# Patient Record
Sex: FEMALE | Employment: FULL TIME | URBAN - METROPOLITAN AREA
[De-identification: names, ages, dates, MRNs, and addresses within clinical notes are randomized per-mention and may not be internally consistent; named-entity substitution may affect disease eponyms.]

---

## 2019-09-17 ENCOUNTER — OFFICE VISIT (OUTPATIENT)
Dept: PEDIATRICS | Facility: CLINIC | Age: 29
End: 2019-09-17
Payer: COMMERCIAL

## 2019-09-17 VITALS
HEART RATE: 65 BPM | DIASTOLIC BLOOD PRESSURE: 62 MMHG | HEIGHT: 64 IN | SYSTOLIC BLOOD PRESSURE: 118 MMHG | WEIGHT: 148 LBS | TEMPERATURE: 97.6 F | OXYGEN SATURATION: 99 % | BODY MASS INDEX: 25.27 KG/M2

## 2019-09-17 DIAGNOSIS — L84 CORN OR CALLUS: Primary | ICD-10-CM

## 2019-09-17 PROCEDURE — 99203 OFFICE O/P NEW LOW 30 MIN: CPT | Performed by: NURSE PRACTITIONER

## 2019-09-17 ASSESSMENT — MIFFLIN-ST. JEOR: SCORE: 1371.32

## 2019-09-17 NOTE — PATIENT INSTRUCTIONS
"Patient Education     May try Salicylic Acid Plaster-40% is available over-the-counter without a prescription   -Cut the plaster to the size of the lesion.  -Leave in place for 48-72 hours; keep dry.  -Remove the white \"dead\" skin with a metal nail file or pumice stone each night before replacing the patch. You can use tape over the patch to keep it in place.  -Use of the patch should stop once the lesion has resolved.   Wear shoes and socks that fit properly. Tight shoes or shoes with high heels can cause corns and calluses.  Avoid going barefoot, or wearing shoes without socks.  You can also use special pads inside your shoes to prevent rubbing.  Follow up if lesions do not resolve within two weeks  Treating Corns and Calluses  If your corns or calluses are mild, reducing friction may help. Different shoes, moleskin patches, or soft pads may be all the treatment you need. In more severe cases, treating tissue buildup may require your doctor s care. Sometimes custom-made shoe inserts (orthotics) or special pads are prescribed to reduce friction and pressure.     Doctor examining senior man's foot.   Change shoes  If you have corns, your doctor may suggest wearing shoes that have more toe room. This way, buckled joints are less likely to be pinched against the top of the shoe. If you have calluses, wearing a cushioned insole, arch support, or heel counter can help reduce friction.  Visit your doctor  In some cases, your doctor may trim away the outer layers of skin that make up the corn or callus. For a painful corn, medicine may be injected beneath the built-up tissue.  Wear orthotics  Orthotics are specially made to meet the needs of your feet. They cushion calluses or divert pressure away from these problem areas. Worn as directed, orthotics help limit existing problems and prevent new ones from forming.  If you need surgery  If a bone or joint is out of place, certain parts of your foot may be under too much " pressure. This can cause severe corns and calluses. In such cases, surgery may be the best way to correct the problem.  Outpatient procedures  In most cases, surgery to improve bone position is an outpatient procedure. Your doctor may cut away excess bone, reposition prominent bones, or even fuse joints. Sometimes tendons or ligaments are cut to reduce tension on a bone or joint. Your doctor will talk with you about the procedure that is best suited to your needs.  Date Last Reviewed: 7/1/2016 2000-2018 [employer]. [address] [place], [place] [id number]. All rights reserved. This information is not intended as a substitute for professional medical care. Always follow your healthcare professional's instructions.

## 2019-09-17 NOTE — PROGRESS NOTES
"Subjective     Deloris Levi is a 29 year old female who is new to the clinic, otherwise in good health, and presents to clinic today for the following health issues:    HPI   Foot Pain    Onset: 1.5 years    Description:   Location: left foot   Character: Dull ache    Intensity: mild    Progression of Symptoms: worse    Accompanying Signs & Symptoms:  Other symptoms: none    History:   Previous similar pain: no       Precipitating factors:   Trauma or overuse: no     Alleviating factors:  Improved by: rest/inactivity    Therapies Tried and outcome: Corn Bandages did not help       Moved from [facility] 4 mos ago.  Started very small, increasing in size.   For the past 1 week, painful with weight bearing/walking. No pain at rest.  Tried OTC corn cap x24 hours and was able to pick some skin away.  No other MSK injury/pain.    Reviewed and updated as needed this visit by Provider  Meds  Problems         Review of Systems   Otherwise ROS is negative except as stated above.        Objective    /62 (BP Location: Right arm, Patient Position: Chair, Cuff Size: Adult Regular)   Pulse 65   Temp 97.6  F (36.4  C) (Tympanic)   Ht 1.626 m (5' 4\")   Wt 67.1 kg (148 lb)   SpO2 99%   BMI 25.40 kg/m    Body mass index is 25.4 kg/m .  Physical Exam   GENERAL: healthy, alert and no distress  SKIN: x1 hyperkeratotic papule slightly tender to direct pressure/palpation with central core noted on plantar surface of left foot at base 5th and 4th toe. Lesion/dead skin pared down with 15 blade, no bleeding or thrombosed capillaries present with this    Diagnostic Test Results:  none         Assessment & Plan     1. Corn or callus    Patient Instructions     Patient Education     May try Salicylic Acid Plaster-40% is available over-the-counter without a prescription   -Cut the plaster to the size of the lesion.  -Leave in place for 48-72 hours; keep dry.  -Remove the white \"dead\" skin with a metal nail file or pumice stone each " night before replacing the patch. You can use tape over the patch to keep it in place.  -Use of the patch should stop once the lesion has resolved.   Wear shoes and socks that fit properly. Tight shoes or shoes with high heels can cause corns and calluses.  Avoid going barefoot, or wearing shoes without socks.  You can also use special pads inside your shoes to prevent rubbing.  Follow up if lesions do not resolve within two weeks  Treating Corns and Calluses  If your corns or calluses are mild, reducing friction may help. Different shoes, moleskin patches, or soft pads may be all the treatment you need. In more severe cases, treating tissue buildup may require your doctor s care. Sometimes custom-made shoe inserts (orthotics) or special pads are prescribed to reduce friction and pressure.     Doctor examining senior man's foot.   Change shoes  If you have corns, your doctor may suggest wearing shoes that have more toe room. This way, buckled joints are less likely to be pinched against the top of the shoe. If you have calluses, wearing a cushioned insole, arch support, or heel counter can help reduce friction.  Visit your doctor  In some cases, your doctor may trim away the outer layers of skin that make up the corn or callus. For a painful corn, medicine may be injected beneath the built-up tissue.  Wear orthotics  Orthotics are specially made to meet the needs of your feet. They cushion calluses or divert pressure away from these problem areas. Worn as directed, orthotics help limit existing problems and prevent new ones from forming.  If you need surgery  If a bone or joint is out of place, certain parts of your foot may be under too much pressure. This can cause severe corns and calluses. In such cases, surgery may be the best way to correct the problem.  Outpatient procedures  In most cases, surgery to improve bone position is an outpatient procedure. Your doctor may cut away excess bone, reposition prominent  bones, or even fuse joints. Sometimes tendons or ligaments are cut to reduce tension on a bone or joint. Your doctor will talk with you about the procedure that is best suited to your needs.  Date Last Reviewed: 7/1/2016 2000-2018 [employer]. [address] [place] [address]. All rights reserved. This information is not intended as a substitute for professional medical care. Always follow your healthcare professional's instructions.             Return in about 2 weeks (around 10/1/2019) for Follow-up if symptoms do not improve or worsen.    Susana Groves NP  [facility]

## 2020-09-08 ENCOUNTER — OFFICE VISIT (OUTPATIENT)
Dept: PEDIATRICS | Facility: CLINIC | Age: 30
End: 2020-09-08
Payer: COMMERCIAL

## 2020-09-08 VITALS
RESPIRATION RATE: 16 BRPM | SYSTOLIC BLOOD PRESSURE: 100 MMHG | WEIGHT: 148 LBS | TEMPERATURE: 98.2 F | BODY MASS INDEX: 25.4 KG/M2 | OXYGEN SATURATION: 100 % | HEART RATE: 70 BPM | DIASTOLIC BLOOD PRESSURE: 62 MMHG

## 2020-09-08 DIAGNOSIS — Z86.39 HISTORY OF HYPERLIPIDEMIA: Primary | ICD-10-CM

## 2020-09-08 PROCEDURE — 80061 LIPID PANEL: CPT | Performed by: PHYSICIAN ASSISTANT

## 2020-09-08 PROCEDURE — 99213 OFFICE O/P EST LOW 20 MIN: CPT | Performed by: PHYSICIAN ASSISTANT

## 2020-09-08 PROCEDURE — 36415 COLL VENOUS BLD VENIPUNCTURE: CPT | Performed by: PHYSICIAN ASSISTANT

## 2020-09-08 NOTE — PROGRESS NOTES
Subjective     Deloris Levi is a 30 year old female who presents to clinic today for the following health issues:    HPI   Pt states she had borderline elevated cholesterol around 2 years ago while pregnant.  Chol level was 200.      Patient only wants chol level. Refuses any other screening labs. Refuses physical.     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    FH: father with elevated BP. Mother with diabetes.     Review of Systems   Constitutional, HEENT, cardiovascular, pulmonary, gi and gu systems are negative, except as otherwise noted.      Objective    /62   Pulse 70   Temp 98.2  F (36.8  C) (Tympanic)   Resp 16   Wt 67.1 kg (148 lb)   SpO2 100%   BMI 25.40 kg/m    Body mass index is 25.4 kg/m .  Physical Exam   GENERAL: healthy, alert and no distress  RESP: lungs clear to auscultation - no rales, rhonchi or wheezes  CV: regular rate and rhythm, normal S1 S2, no S3 or S4    No results found for this or any previous visit (from the past 24 hour(s)).        Assessment & Plan   (Z86.39) History of hyperlipidemia  (primary encounter diagnosis)  Comment: labs completed today to recheck. Patient has no further concerns.  Plan: Lipid panel reflex to direct LDL Fasting          Mansoor Villareal PA-C  [facility]

## 2020-09-08 NOTE — PROGRESS NOTES
Dwayne Levi is a 30 year old female who presents to clinic today for the following health issues:    HPI       Hyperlipidemia Follow-Up      Are you regularly taking any medication or supplement to lower your cholesterol?   { :423471}    Are you having muscle aches or other side effects that you think could be caused by your cholesterol lowering medication?  { :322360}      How many servings of fruits and vegetables do you eat daily?  { :422185}    On average, how many sweetened beverages do you drink each day (Examples: soda, juice, sweet tea, etc.  Do NOT count diet or artificially sweetened beverages)?   { 1-11:247076}    How many days per week do you exercise enough to make your heart beat faster? { :747936}    How many minutes a day do you exercise enough to make your heart beat faster? { :255982}    How many days per week do you miss taking your medication? {0-7 :552311}    {additonal problems for provider to add (Optional):702817}    Review of Systems   {ROS COMP (Optional):573366}      Objective    There were no vitals taken for this visit.  There is no height or weight on file to calculate BMI.  Physical Exam   {Exam List (Optional):065671}    {Diagnostic Test Results (Optional):165362}        {PROVIDER CHARTING PREFERENCE:316396}

## 2020-09-08 NOTE — LETTER
September 9, 2020      Deloris Levi  [address] [place] [address]        Dear ce,    Here are the results of your cholesterol levels.   Everything looks fine. No medications indicated.     Mansoor Villareal PA-C     Resulted Orders   Lipid panel reflex to direct LDL Fasting   Result Value Ref Range    Cholesterol 186 <200 mg/dL    Triglycerides 143 <150 mg/dL    HDL Cholesterol 49 (L) >49 mg/dL    LDL Cholesterol Calculated 108 (H) <100 mg/dL      Comment:      Above desirable:  100-129 mg/dl  Borderline High:  130-159 mg/dL  High:             160-189 mg/dL  Very high:       >189 mg/dl      Non HDL Cholesterol 137 (H) <130 mg/dL      Comment:      Above Desirable:  130-159 mg/dl  Borderline high:  160-189 mg/dl  High:             190-219 mg/dl  Very high:       >219 mg/dl

## 2020-09-09 LAB
CHOLEST SERPL-MCNC: 186 MG/DL
HDLC SERPL-MCNC: 49 MG/DL
LDLC SERPL CALC-MCNC: 108 MG/DL
NONHDLC SERPL-MCNC: 137 MG/DL
TRIGL SERPL-MCNC: 143 MG/DL

## 2020-09-13 ENCOUNTER — NURSE TRIAGE (OUTPATIENT)
Dept: NURSING | Facility: CLINIC | Age: 30
End: 2020-09-13

## 2020-09-13 NOTE — TELEPHONE ENCOUNTER
S: Lipid panel results.  B: Wife gave writer permission to talk to her  about her health care. Writer gave results of lupid panel results and provider comments.  A:  Provider indicated to medications will be needed.  R: Letter will be mailed out to patient. Patient verbalized understanding, in agreement with plan, and voiced no further questions.  Rocio Siddiqui RN, [facility]      Additional Information    Health Information question, no triage required and triager able to answer question    Protocols used: INFORMATION ONLY CALL-A-    COVID 19 Nurse Triage Plan/Patient Instructions    Please be aware that novel coronavirus (COVID-19) may be circulating in the community. If you develop symptoms such as fever, cough, or SOB or if you have concerns about the presence of another infection including coronavirus (COVID-19), please contact your health care provider or visit [email or web address].     Disposition/Instructions    Home care recommended. Follow home care protocol based instructions.    Thank you for taking steps to prevent the spread of this virus.  o Limit your contact with others.  o Wear a simple mask to cover your cough.  o Wash your hands well and often.    Resources    [facility]: About COVID-19: [email or web address]    CDC: What to Do If You're Sick: www.cdc.gov/coronavirus/2019-ncov/about/steps-when-sick.html    CDC: Ending Home Isolation: www.cdc.gov/coronavirus/2019-ncov/hcp/disposition-in-home-patients.html     CDC: Caring for Someone: www.cdc.gov/coronavirus/2019-ncov/if-you-are-sick/care-for-someone.html     [facility]: Interim Guidance for Hospital Discharge to Home: www.health.[facility].[place]/diseases/coronavirus/hcp/hospdischarge.pdf    [facility] clinical trials (COVID-19 research studies): clinicalaffairs.[email or web address]/umn-clinical-trials     Below are the COVID-19 hotlines at the [place] Department of [facility] ([facility]). Interpreters are available.   o For health  questions: Call 751-896-0158 or 1-894.275.8173 (7 a.m. to 7 p.m.)  o For questions about schools and childcare: Call 665-139-9283 or 1-462.684.4846 (7 a.m. to 7 p.m.)

## 2021-11-09 ENCOUNTER — OFFICE VISIT (OUTPATIENT)
Dept: URGENT CARE | Facility: URGENT CARE | Age: 31
End: 2021-11-09
Payer: COMMERCIAL

## 2021-11-09 VITALS
SYSTOLIC BLOOD PRESSURE: 150 MMHG | RESPIRATION RATE: 20 BRPM | DIASTOLIC BLOOD PRESSURE: 78 MMHG | TEMPERATURE: 98 F | HEART RATE: 78 BPM | OXYGEN SATURATION: 98 %

## 2021-11-09 DIAGNOSIS — R22.9 LOCALIZED SUPERFICIAL SWELLING, MASS, OR LUMP: Primary | ICD-10-CM

## 2021-11-09 PROCEDURE — 99213 OFFICE O/P EST LOW 20 MIN: CPT | Performed by: PHYSICIAN ASSISTANT

## 2021-11-09 NOTE — PATIENT INSTRUCTIONS
Patient Education     Lipoma, No Treatment  A lipoma is a non-cancerous (benign) tumor made up of fat tissue. It appears as a soft raised area, just under the skin. It's usually less than 2 inches across.   Home care  General information regarding lipoma includes:    No special care is needed for a lipoma.    You can consider removal for cosmetic reasons.    Sometimes lipomas are uncomfortable because they put pressure on surrounding tissues. This is also a reason to have a lipoma removed.  Follow-up care  Follow up with your healthcare provider, or as advised if you want to have the lipoma removed at a later time.   When to seek medical advice  Call your healthcare provider right away if any of the following occur:    Redness, pain, tenderness, or drainage from the lipoma    Lipoma starts to enlarge, change shape, or become more solid    Changes in the color of the skin over the lipoma  Sara last reviewed this educational content on 7/1/2019 2000-2021 [employer]. All rights reserved. This information is not intended as a substitute for professional medical care. Always follow your healthcare professional's instructions.

## 2021-11-13 NOTE — PROGRESS NOTES
SUBJECTIVE:  Deloris Levi is a 31 year old female who presents to the clinic today for swelling of right anterior thigh.  Onset of rash was 2 week(s) ago.   Rash is still present. No pain, not changing    History reviewed. No pertinent past medical history.  No current outpatient medications on file.     Social History     Tobacco Use     Smoking status: Not on file     Smokeless tobacco: Not on file   Substance Use Topics     Alcohol use: Not on file       ROS:  Review of systems negative except as stated above.    EXAM:   BP (!) 150/78   Pulse 78   Temp 98  F (36.7  C) (Tympanic)   Resp 20   SpO2 98%   GENERAL: alert, no acute distress.  SKIN: soft , nontender, mobile area of swelling  RESP: lungs clear to auscultation - no rales, rhonchi or wheezes  CV: regular rates and rhythm, normal S1 S2, no murmur noted  NEURO: Normal strength and tone, sensory exam grossly normal,  normal speech and mentation    ASSESSMENT:  (R22.9) Localized superficial swelling, mass, or lump  (primary encounter diagnosis)  Comment: appears to be lipoma  Plan: Adult Dermatology Referral        To derm for confirmation and plan    Patient Instructions     Patient Education     Lipoma, No Treatment  A lipoma is a non-cancerous (benign) tumor made up of fat tissue. It appears as a soft raised area, just under the skin. It's usually less than 2 inches across.   Home care  General information regarding lipoma includes:    No special care is needed for a lipoma.    You can consider removal for cosmetic reasons.    Sometimes lipomas are uncomfortable because they put pressure on surrounding tissues. This is also a reason to have a lipoma removed.  Follow-up care  Follow up with your healthcare provider, or as advised if you want to have the lipoma removed at a later time.   When to seek medical advice  Call your healthcare provider right away if any of the following occur:    Redness, pain, tenderness, or drainage from the  lipoma    Lipoma starts to enlarge, change shape, or become more solid    Changes in the color of the skin over the lipoma  [employer] last reviewed this educational content on 7/1/2019 2000-2021 [employer]. All rights reserved. This information is not intended as a substitute for professional medical care. Always follow your healthcare professional's instructions.

## 2021-12-05 ENCOUNTER — HEALTH MAINTENANCE LETTER (OUTPATIENT)
Age: 31
End: 2021-12-05

## 2021-12-07 ENCOUNTER — OFFICE VISIT (OUTPATIENT)
Dept: PEDIATRICS | Facility: CLINIC | Age: 31
End: 2021-12-07
Payer: COMMERCIAL

## 2021-12-07 VITALS
DIASTOLIC BLOOD PRESSURE: 78 MMHG | TEMPERATURE: 98.6 F | HEART RATE: 76 BPM | HEIGHT: 64 IN | SYSTOLIC BLOOD PRESSURE: 124 MMHG | BODY MASS INDEX: 26.21 KG/M2 | WEIGHT: 153.5 LBS | RESPIRATION RATE: 12 BRPM | OXYGEN SATURATION: 99 %

## 2021-12-07 DIAGNOSIS — Z01.419 ENCOUNTER FOR GYNECOLOGICAL EXAMINATION WITHOUT ABNORMAL FINDING: ICD-10-CM

## 2021-12-07 DIAGNOSIS — Z00.00 ROUTINE GENERAL MEDICAL EXAMINATION AT A HEALTH CARE FACILITY: Primary | ICD-10-CM

## 2021-12-07 DIAGNOSIS — Z13.0 SCREENING, IRON DEFICIENCY ANEMIA: ICD-10-CM

## 2021-12-07 DIAGNOSIS — Z13.21 ENCOUNTER FOR VITAMIN DEFICIENCY SCREENING: ICD-10-CM

## 2021-12-07 DIAGNOSIS — Z12.4 SCREENING FOR CERVICAL CANCER: ICD-10-CM

## 2021-12-07 DIAGNOSIS — E55.9 VITAMIN D DEFICIENCY: ICD-10-CM

## 2021-12-07 DIAGNOSIS — Z13.29 SCREENING FOR THYROID DISORDER: ICD-10-CM

## 2021-12-07 DIAGNOSIS — R79.0 LOW IRON STORES: ICD-10-CM

## 2021-12-07 DIAGNOSIS — Z13.1 SCREENING FOR DIABETES MELLITUS: ICD-10-CM

## 2021-12-07 DIAGNOSIS — Z13.220 SCREENING FOR LIPID DISORDERS: ICD-10-CM

## 2021-12-07 DIAGNOSIS — Z11.51 SCREENING FOR HUMAN PAPILLOMAVIRUS: ICD-10-CM

## 2021-12-07 DIAGNOSIS — Z23 NEED FOR INFLUENZA VACCINATION: ICD-10-CM

## 2021-12-07 LAB
ERYTHROCYTE [DISTWIDTH] IN BLOOD BY AUTOMATED COUNT: 12.9 % (ref 10–15)
HBA1C MFR BLD: 5.6 % (ref 0–5.6)
HCT VFR BLD AUTO: 38.1 % (ref 35–47)
HGB BLD-MCNC: 12.4 G/DL (ref 11.7–15.7)
MCH RBC QN AUTO: 29 PG (ref 26.5–33)
MCHC RBC AUTO-ENTMCNC: 32.5 G/DL (ref 31.5–36.5)
MCV RBC AUTO: 89 FL (ref 78–100)
PLATELET # BLD AUTO: 269 10E3/UL (ref 150–450)
RBC # BLD AUTO: 4.28 10E6/UL (ref 3.8–5.2)
WBC # BLD AUTO: 6.5 10E3/UL (ref 4–11)

## 2021-12-07 PROCEDURE — 99395 PREV VISIT EST AGE 18-39: CPT | Mod: 25 | Performed by: STUDENT IN AN ORGANIZED HEALTH CARE EDUCATION/TRAINING PROGRAM

## 2021-12-07 PROCEDURE — 85027 COMPLETE CBC AUTOMATED: CPT | Performed by: STUDENT IN AN ORGANIZED HEALTH CARE EDUCATION/TRAINING PROGRAM

## 2021-12-07 PROCEDURE — 83036 HEMOGLOBIN GLYCOSYLATED A1C: CPT | Performed by: STUDENT IN AN ORGANIZED HEALTH CARE EDUCATION/TRAINING PROGRAM

## 2021-12-07 PROCEDURE — 90471 IMMUNIZATION ADMIN: CPT | Performed by: STUDENT IN AN ORGANIZED HEALTH CARE EDUCATION/TRAINING PROGRAM

## 2021-12-07 PROCEDURE — 84443 ASSAY THYROID STIM HORMONE: CPT | Performed by: STUDENT IN AN ORGANIZED HEALTH CARE EDUCATION/TRAINING PROGRAM

## 2021-12-07 PROCEDURE — G0145 SCR C/V CYTO,THINLAYER,RESCR: HCPCS | Performed by: STUDENT IN AN ORGANIZED HEALTH CARE EDUCATION/TRAINING PROGRAM

## 2021-12-07 PROCEDURE — 87624 HPV HI-RISK TYP POOLED RSLT: CPT | Performed by: STUDENT IN AN ORGANIZED HEALTH CARE EDUCATION/TRAINING PROGRAM

## 2021-12-07 PROCEDURE — 36415 COLL VENOUS BLD VENIPUNCTURE: CPT | Performed by: STUDENT IN AN ORGANIZED HEALTH CARE EDUCATION/TRAINING PROGRAM

## 2021-12-07 PROCEDURE — 82306 VITAMIN D 25 HYDROXY: CPT | Performed by: STUDENT IN AN ORGANIZED HEALTH CARE EDUCATION/TRAINING PROGRAM

## 2021-12-07 PROCEDURE — 82728 ASSAY OF FERRITIN: CPT | Performed by: STUDENT IN AN ORGANIZED HEALTH CARE EDUCATION/TRAINING PROGRAM

## 2021-12-07 PROCEDURE — 90686 IIV4 VACC NO PRSV 0.5 ML IM: CPT | Performed by: STUDENT IN AN ORGANIZED HEALTH CARE EDUCATION/TRAINING PROGRAM

## 2021-12-07 PROCEDURE — 80061 LIPID PANEL: CPT | Performed by: STUDENT IN AN ORGANIZED HEALTH CARE EDUCATION/TRAINING PROGRAM

## 2021-12-07 ASSESSMENT — ENCOUNTER SYMPTOMS
SORE THROAT: 0
MYALGIAS: 0
JOINT SWELLING: 0
CONSTIPATION: 0
NAUSEA: 0
ARTHRALGIAS: 0
CHILLS: 0
DIARRHEA: 0
HEADACHES: 0
ABDOMINAL PAIN: 0
DYSURIA: 0
NERVOUS/ANXIOUS: 1
PARESTHESIAS: 0
DIZZINESS: 0
COUGH: 0
FEVER: 0
HEMATOCHEZIA: 0
SHORTNESS OF BREATH: 0
WEAKNESS: 1
HEMATURIA: 0
PALPITATIONS: 0
HEARTBURN: 0
EYE PAIN: 0
FREQUENCY: 0

## 2021-12-07 ASSESSMENT — MIFFLIN-ST. JEOR: SCORE: 1382.78

## 2021-12-07 NOTE — PROGRESS NOTES
SUBJECTIVE:   CC: Deloris Levi is an 31 year old woman who presents for preventive health visit.       Patient has been advised of split billing requirements and indicates understanding: Yes  Healthy Habits:     Getting at least 3 servings of Calcium per day:  NO    Bi-annual eye exam:  NO    Dental care twice a year:  Yes    Sleep apnea or symptoms of sleep apnea:  None    Diet:  Breakfast skipped    Frequency of exercise:  None    Taking medications regularly:  Not Applicable    Medication side effects:  None    PHQ-2 Total Score: 2    Additional concerns today:  No      2 children: 8 & 4 year old     Works in [employer] for [employer]    Concerned about diabetes as mother has diabetes mellitus  Also concerned about hyperlipidemia as in a pregnancy it was high  Also had abnl TSH in pregnancy  Had tubal ligation surgery previously  Feels diet is not great (doesn't love dairy products) and interested in vitamin testing and nutrition recommendations  Never had Pap before      Today's PHQ-2 Score:   PHQ-2 ( 1999 Pfizer) 12/7/2021   Q1: Little interest or pleasure in doing things 1   Q2: Feeling down, depressed or hopeless 1   PHQ-2 Score 2   Q1: Little interest or pleasure in doing things Several days   Q2: Feeling down, depressed or hopeless Several days   PHQ-2 Score 2       Abuse: Current or Past (Physical, Sexual or Emotional) - No  Do you feel safe in your environment? Yes    Have you ever done Advance Care Planning? (For example, a Health Directive, POLST, or a discussion with a medical provider or your loved ones about your wishes): No, advance care planning information given to patient to review.  Patient declined advance care planning discussion at this time.    Social History     Tobacco Use     Smoking status: Never Smoker     Smokeless tobacco: Never Used   Substance Use Topics     Alcohol use: Never     If you drink alcohol do you typically have >3 drinks per day or >7 drinks per week? Not applicable    Alcohol  "Use 12/7/2021   Prescreen: >3 drinks/day or >7 drinks/week? Not Applicable       Reviewed orders with patient.  Reviewed health maintenance and updated orders accordingly - Yes  Lab work is in process    Breast Cancer Screening:  Any new diagnosis of family breast, ovarian, or bowel cancer? No    FHS-7: No flowsheet data found.    Patient under 40 years of age: Routine Mammogram Screening not recommended.   Pertinent mammograms are reviewed under the imaging tab.    History of abnormal Pap smear: NO - age 30-65 PAP every 5 years with negative HPV co-testing recommended     Reviewed and updated as needed this visit by clinical staff  Tobacco  Allergies  Meds   Med Hx  Surg Hx  Fam Hx  Soc Hx       Reviewed and updated as needed this visit by Provider                   Review of Systems   Constitutional: Negative for chills and fever.   HENT: Negative for congestion, ear pain, hearing loss and sore throat.    Eyes: Negative for pain and visual disturbance.   Respiratory: Negative for cough and shortness of breath.    Cardiovascular: Negative for chest pain, palpitations and peripheral edema.   Gastrointestinal: Negative for abdominal pain, constipation, diarrhea, heartburn, hematochezia and nausea.   Genitourinary: Negative for dysuria, frequency, genital sores, hematuria and urgency.   Musculoskeletal: Negative for arthralgias, joint swelling and myalgias.   Skin: Negative for rash.   Neurological: Positive for weakness. Negative for dizziness, headaches and paresthesias.   Psychiatric/Behavioral: Positive for mood changes. The patient is nervous/anxious.         OBJECTIVE:   /78 (BP Location: Right arm, Patient Position: Sitting, Cuff Size: Adult Regular)   Pulse 76   Temp 98.6  F (37  C) (Tympanic)   Resp 12   Ht 1.62 m (5' 3.78\")   Wt 69.6 kg (153 lb 8 oz)   LMP 11/05/2021 (Within Days)   SpO2 99%   BMI 26.53 kg/m    Physical Exam  GENERAL: healthy, alert and no distress  EYES: Eyes grossly " normal to inspection, PERRL and conjunctivae and sclerae normal  HENT: ear canals and TM's normal, nose and mouth without ulcers or lesions  NECK: no adenopathy, no asymmetry, masses, or scars and thyroid normal to palpation  RESP: lungs clear to auscultation - no rales, rhonchi or wheezes  CV: regular rate and rhythm, normal S1 S2, no S3 or S4, no murmur, click or rub, no peripheral edema and peripheral pulses strong  ABDOMEN: soft, nontender, no hepatosplenomegaly, no masses and bowel sounds normal   (female): normal female external genitalia, normal urethral meatus, vaginal mucosa pink, moist, well rugated, and normal cervix/adnexa/uterus without masses or discharge  MS: no gross musculoskeletal defects noted, no edema  SKIN: no suspicious lesions or rashes  NEURO: Normal strength and tone, mentation intact and speech normal  PSYCH: mentation appears normal, affect normal/bright      ASSESSMENT/PLAN:       ICD-10-CM    1. Routine general medical examination at a health care facility  Z00.00    2. Screening for human papillomavirus  Z11.51 Pap Screen with HPV - recommended age 30 - 65 years   3. Screening for cervical cancer  Z12.4 Pap Screen with HPV - recommended age 30 - 65 years   4. Encounter for gynecological examination without abnormal finding  Z01.419    5. Screening for lipid disorders  Z13.220 Lipid panel reflex to direct LDL Fasting     Lipid Profile (Chol, Trig, HDL, LDL calc)     Lipid panel reflex to direct LDL Fasting     CANCELED: Lipid Profile (Chol, Trig, HDL, LDL calc)   6. Need for influenza vaccination  Z23 INFLUENZA VACCINE IM >6 MO VALENT IIV4 (ALFURIA/FLUZONE)   7. Screening for diabetes mellitus  Z13.1 Hemoglobin A1c     Hemoglobin A1c   8. Screening, iron deficiency anemia  Z13.0 Ferritin     CBC with platelets     Ferritin     CBC with platelets   9. Encounter for vitamin deficiency screening  Z13.21 Vitamin D Deficiency     Vitamin D Deficiency   10. Screening for thyroid disorder   "Z13.29 TSH with free T4 reflex     TSH with free T4 reflex       COUNSELING:  Reviewed preventive health counseling, as reflected in patient instructions       Regular exercise       Healthy diet/nutrition       Immunizations    Vaccinated for: Influenza and Declined:COVID19 booster due to has appointment later this week for it and prefers to separate it from flu shot       Contraception       Safe sex practices/STD prevention       Consider Hep C screening for all patients one time for ages 18-79 years       HIV screeninx in teen years, 1x in adult years, and at intervals if high risk    Estimated body mass index is 26.53 kg/m  as calculated from the following:    Height as of this encounter: 1.62 m (5' 3.78\").    Weight as of this encounter: 69.6 kg (153 lb 8 oz).      She reports that she has never smoked. She has never used smokeless tobacco.      Counseling Resources:  ATP IV Guidelines  Pooled Cohorts Equation Calculator  Breast Cancer Risk Calculator  BRCA-Related Cancer Risk Assessment: FHS-7 Tool  FRAX Risk Assessment  ICSI Preventive Guidelines  Dietary Guidelines for Americans,   USDA's MyPlate  ASA Prophylaxis  Lung CA Screening    Darby Vaughn MD  [facility]  "

## 2021-12-07 NOTE — PATIENT INSTRUCTIONS
So nice to meet you!    CALCIUM    Recommendations:  Teenagers and premenopausal women: 1200 mg/day    If you are not eating dairy products you also need 800 IU of vitamin D per day which can be obtained in either a multivitamin or in some of the Calcium tablets.    Dietary sources: These also contain vitamin D  Milk                            8 oz            300 mg  Yogurt                          1 cup           400 mg  Hard cheese                     1.5 oz          300 mg  Cottage cheese                  2 cup           300 mg  Orange juice with Calcium       8 oz            300 mg  Low fat dairy sources are recommended    Supplements:  Tums EX                         300 mg  Tums Ultra                      400 mg  Caltrate 600                    600 mg  Oscal                           500 mg  Oscal/D                         500 mg plus vitamin D  Women's Formula Multivitamin    450 mg       Gardisil is the HPV vaccine  -HPV is the virus that is linked to cervical cancer          Preventive Health Recommendations  Female Ages 26 - 39  Yearly exam:   See your health care provider every year in order to    Review health changes.     Discuss preventive care.      Review your medicines if you your doctor has prescribed any.    Until age 30: Get a Pap test every three years (more often if you have had an abnormal result).    After age 30: Talk to your doctor about whether you should have a Pap test every 3 years or have a Pap test with HPV screening every 5 years.   You do not need a Pap test if your uterus was removed (hysterectomy) and you have not had cancer.  You should be tested each year for STDs (sexually transmitted diseases), if you're at risk.   Talk to your provider about how often to have your cholesterol checked.  If you are at risk for diabetes, you should have a diabetes test (fasting glucose).  Shots: Get a flu shot each year. Get a tetanus shot every 10 years.   Nutrition:     Eat at least 5 servings  of fruits and vegetables each day.    Eat whole-grain bread, whole-wheat pasta and brown rice instead of white grains and rice.    Get adequate Calcium and Vitamin D.     Lifestyle    Exercise at least 150 minutes a week (30 minutes a day, 5 days of the week). This will help you control your weight and prevent disease.    Limit alcohol to one drink per day.    No smoking.     Wear sunscreen to prevent skin cancer.    See your dentist every six months for an exam and cleaning.

## 2021-12-08 LAB — DEPRECATED CALCIDIOL+CALCIFEROL SERPL-MC: 12 UG/L (ref 20–75)

## 2021-12-09 LAB
FERRITIN SERPL-MCNC: 11 NG/ML (ref 12–150)
TSH SERPL DL<=0.005 MIU/L-ACNC: 2.27 MU/L (ref 0.4–4)

## 2021-12-09 RX ORDER — ERGOCALCIFEROL 1.25 MG/1
50000 CAPSULE, LIQUID FILLED ORAL WEEKLY
Qty: 8 CAPSULE | Refills: 0 | Status: SHIPPED | OUTPATIENT
Start: 2021-12-09 | End: 2022-01-28

## 2021-12-10 LAB
BKR LAB AP GYN ADEQUACY: NORMAL
BKR LAB AP GYN INTERPRETATION: NORMAL
BKR LAB AP HPV REFLEX: NORMAL
BKR LAB AP PREVIOUS ABNORMAL: NORMAL
PATH REPORT.COMMENTS IMP SPEC: NORMAL
PATH REPORT.COMMENTS IMP SPEC: NORMAL
PATH REPORT.RELEVANT HX SPEC: NORMAL

## 2021-12-14 LAB
HUMAN PAPILLOMA VIRUS 16 DNA: NEGATIVE
HUMAN PAPILLOMA VIRUS 18 DNA: NEGATIVE
HUMAN PAPILLOMA VIRUS FINAL DIAGNOSIS: NORMAL
HUMAN PAPILLOMA VIRUS OTHER HR: NEGATIVE

## 2021-12-17 LAB
CHOLEST SERPL-MCNC: 216 MG/DL
FASTING STATUS PATIENT QL REPORTED: ABNORMAL
HDLC SERPL-MCNC: 47 MG/DL
LDLC SERPL CALC-MCNC: 140 MG/DL
NONHDLC SERPL-MCNC: 169 MG/DL
TRIGL SERPL-MCNC: 145 MG/DL

## 2022-09-18 ENCOUNTER — HEALTH MAINTENANCE LETTER (OUTPATIENT)
Age: 32
End: 2022-09-18

## 2022-11-09 ENCOUNTER — OFFICE VISIT (OUTPATIENT)
Dept: PEDIATRICS | Facility: CLINIC | Age: 32
End: 2022-11-09
Payer: COMMERCIAL

## 2022-11-09 VITALS
RESPIRATION RATE: 20 BRPM | HEART RATE: 76 BPM | DIASTOLIC BLOOD PRESSURE: 62 MMHG | HEIGHT: 64 IN | BODY MASS INDEX: 24.24 KG/M2 | SYSTOLIC BLOOD PRESSURE: 94 MMHG | OXYGEN SATURATION: 98 % | TEMPERATURE: 98.2 F | WEIGHT: 142 LBS

## 2022-11-09 DIAGNOSIS — R79.0 LOW IRON STORES: ICD-10-CM

## 2022-11-09 DIAGNOSIS — M25.561 RIGHT KNEE PAIN, UNSPECIFIED CHRONICITY: Primary | ICD-10-CM

## 2022-11-09 DIAGNOSIS — L65.9 HAIR LOSS: ICD-10-CM

## 2022-11-09 DIAGNOSIS — E55.9 VITAMIN D DEFICIENCY: ICD-10-CM

## 2022-11-09 LAB
ERYTHROCYTE [DISTWIDTH] IN BLOOD BY AUTOMATED COUNT: 12.4 % (ref 10–15)
HCT VFR BLD AUTO: 36.4 % (ref 35–47)
HGB BLD-MCNC: 12.1 G/DL (ref 11.7–15.7)
MCH RBC QN AUTO: 29.9 PG (ref 26.5–33)
MCHC RBC AUTO-ENTMCNC: 33.2 G/DL (ref 31.5–36.5)
MCV RBC AUTO: 90 FL (ref 78–100)
PLATELET # BLD AUTO: 306 10E3/UL (ref 150–450)
RBC # BLD AUTO: 4.05 10E6/UL (ref 3.8–5.2)
TSH SERPL DL<=0.005 MIU/L-ACNC: 3.05 MU/L (ref 0.4–4)
WBC # BLD AUTO: 6.2 10E3/UL (ref 4–11)

## 2022-11-09 PROCEDURE — 85027 COMPLETE CBC AUTOMATED: CPT | Performed by: NURSE PRACTITIONER

## 2022-11-09 PROCEDURE — 99214 OFFICE O/P EST MOD 30 MIN: CPT | Performed by: NURSE PRACTITIONER

## 2022-11-09 PROCEDURE — 84443 ASSAY THYROID STIM HORMONE: CPT | Performed by: NURSE PRACTITIONER

## 2022-11-09 PROCEDURE — 82306 VITAMIN D 25 HYDROXY: CPT | Performed by: NURSE PRACTITIONER

## 2022-11-09 PROCEDURE — 36415 COLL VENOUS BLD VENIPUNCTURE: CPT | Performed by: NURSE PRACTITIONER

## 2022-11-09 ASSESSMENT — PAIN SCALES - GENERAL: PAINLEVEL: MODERATE PAIN (5)

## 2022-11-09 ASSESSMENT — PATIENT HEALTH QUESTIONNAIRE - PHQ9
10. IF YOU CHECKED OFF ANY PROBLEMS, HOW DIFFICULT HAVE THESE PROBLEMS MADE IT FOR YOU TO DO YOUR WORK, TAKE CARE OF THINGS AT HOME, OR GET ALONG WITH OTHER PEOPLE: NOT DIFFICULT AT ALL
SUM OF ALL RESPONSES TO PHQ QUESTIONS 1-9: 2
SUM OF ALL RESPONSES TO PHQ QUESTIONS 1-9: 2

## 2022-11-09 NOTE — PROGRESS NOTES
Assessment & Plan     Right knee pain, unspecified chronicity  Likely overuse injury after recent move. If not improving with time then recommend PT.   - Physical Therapy Referral; Future    Hair loss  Diffuse, no reported bald spots. Labs as below.  - Ferritin; Future  - CBC with platelets; Future  - Vitamin D Deficiency; Future  - TSH with free T4 reflex; Future  - CBC with platelets  - Vitamin D Deficiency  - TSH with free T4 reflex    Vitamin D deficiency  Hx of this, not currently taking vit D.  - Ferritin; Future  - CBC with platelets; Future  - Vitamin D Deficiency; Future  - TSH with free T4 reflex; Future  - CBC with platelets  - Vitamin D Deficiency  - TSH with free T4 reflex    Low iron stores  Hx of this, not currently taking iron.  - Ferritin; Future  - CBC with platelets; Future  - Vitamin D Deficiency; Future  - TSH with free T4 reflex; Future  - CBC with platelets  - Vitamin D Deficiency  - TSH with free T4 reflex    Patient Instructions   I will MyChart message you the results of your blood work    You can watch the knee pain for now. You have a PT referral if you'd like to pursue this.      Return in about 1 month (around 12/9/2022) for Physical Exam.    Susana Groves NP  [facility] FROY Puentes is a 32 year old accompanied by her spouse, presenting for the following health issues:  Knee Pain      Knee Pain  This is a new problem. The current episode started 1 to 4 weeks ago. The problem occurs intermittently. The problem has been unchanged. The symptoms are aggravated by walking and standing (climbing stairs). She has tried nothing for the symptoms.   History of Present Illness       Reason for visit:  Rt knee pain     Today's PHQ-9         PHQ-9 Total Score: 2    PHQ-9 Q9 Thoughts of better off dead/self-harm past 2 weeks :   Not at all    How difficult have these problems made it for you to do your work, take care of things at home, or get along with other  "people: Not difficult at all     No previous imaging of knee.  No acute trauma, fall, or injury.  Moved recently and was doing a lot of stairs/lifting and noticed pain after this  No pain at rest or walking  Pain to medial right knee with bending and standing  Denies swelling, redness or warmth of joint    Also wants to discuss hair loss  Washes hair 2x per week, notes a lot of hair loss  NO bald spots    Review of Systems         Objective    BP 94/62   Pulse 76   Temp 98.2  F (36.8  C) (Oral)   Resp 20   Ht 1.626 m (5' 4\")   Wt 64.4 kg (142 lb)   LMP 11/05/2022   SpO2 98%   BMI 24.37 kg/m    Body mass index is 24.37 kg/m .  Physical Exam   GENERAL: healthy, alert and no distress  MS: RIGHT KNEE  Full ROM of knee, non-tender to palpation, no obvious deformity, no joint swelling/redness/warmth                    "

## 2022-11-09 NOTE — PATIENT INSTRUCTIONS
I will MyChart message you the results of your blood work    You can watch the knee pain for now. You have a PT referral if you'd like to pursue this.

## 2022-11-10 DIAGNOSIS — E55.9 VITAMIN D DEFICIENCY: Primary | ICD-10-CM

## 2022-11-10 LAB — DEPRECATED CALCIDIOL+CALCIFEROL SERPL-MC: 10 UG/L (ref 20–75)

## 2022-11-10 RX ORDER — ERGOCALCIFEROL 1.25 MG/1
50000 CAPSULE, LIQUID FILLED ORAL WEEKLY
Qty: 8 CAPSULE | Refills: 0 | Status: SHIPPED | OUTPATIENT
Start: 2022-11-10 | End: 2022-12-30

## 2022-11-13 ENCOUNTER — MYC MEDICAL ADVICE (OUTPATIENT)
Dept: PEDIATRICS | Facility: CLINIC | Age: 32
End: 2022-11-13

## 2022-11-14 NOTE — TELEPHONE ENCOUNTER
Patient following up from office visit on 11/9/22.     Denisha Mccann RN on 11/14/2022 at 8:35 AM

## 2022-11-16 ENCOUNTER — MYC MEDICAL ADVICE (OUTPATIENT)
Dept: PEDIATRICS | Facility: CLINIC | Age: 32
End: 2022-11-16

## 2022-11-16 NOTE — TELEPHONE ENCOUNTER
See patient's [employer] message     - Patient states that she has not received her ferritin results at this time   - Appears patient had labs drawn on 11/9/2022 (CBC with platelets, Vitamin D deficiency, and TSH with free T4 reflex)   - Future lab order placed for Ferritin   - Does not appear a Ferritin lab draw was completed on 11/9/2022   - Called [facility] and spoke with Yeshe   - Yeshe states that the ferritin lab can no longer be added on as it has been greater than 7 days since 11/9/2022     - Called the patient at [phone], no answer   - Sent a WeGather message to the patient informing her that a Ferritin lab was not drawn, order is placed, informed patient that she can schedule a lab appointment via WeGather or by calling [phone]     Dorothy CHAO RN   Patient Advocate Liaison (PAL)  [facility]

## 2022-11-21 ENCOUNTER — OFFICE VISIT (OUTPATIENT)
Dept: URGENT CARE | Facility: URGENT CARE | Age: 32
End: 2022-11-21
Payer: COMMERCIAL

## 2022-11-21 VITALS
WEIGHT: 141.6 LBS | DIASTOLIC BLOOD PRESSURE: 68 MMHG | TEMPERATURE: 100.7 F | BODY MASS INDEX: 24.31 KG/M2 | HEART RATE: 113 BPM | OXYGEN SATURATION: 98 % | SYSTOLIC BLOOD PRESSURE: 108 MMHG

## 2022-11-21 DIAGNOSIS — J10.1 INFLUENZA A: ICD-10-CM

## 2022-11-21 DIAGNOSIS — R50.9 FEVER IN ADULT: Primary | ICD-10-CM

## 2022-11-21 LAB
FLUAV AG SPEC QL IA: POSITIVE
FLUBV AG SPEC QL IA: NEGATIVE

## 2022-11-21 PROCEDURE — 87804 INFLUENZA ASSAY W/OPTIC: CPT | Performed by: FAMILY MEDICINE

## 2022-11-21 PROCEDURE — 99213 OFFICE O/P EST LOW 20 MIN: CPT | Performed by: FAMILY MEDICINE

## 2022-11-21 NOTE — PATIENT INSTRUCTIONS
If you have not had COVID in the last 12 weeks I recommend you do a home rapid COVID test    If fever hasn't resolved by day 5 of symptoms please call your Doctor's office    Tylenol 500-650mg and Ibuprofen 400-600mg (alternate every 3 hours)

## 2022-11-21 NOTE — PROGRESS NOTES
Assessment & Plan     Fever in adult  - Influenza A & B Antigen - Clinic Collect    Influenza A  Fever responding to tylenol -- recommended ibuprofen and alternating both to manage fevers. Patient does not appear dehydrated.   Normal lung exam - defer xray imaging.   Past window for tamiflu therapy. Recommend home rapid COVID test tonight.   F/u in 2-3 days if fever hasn't resolved.   See AVS summary for additional recommendations reviewed with patient during this visit.         Lul Mota MD   [facility]    Dwayne Puentes is a 32 year old female who presents to clinic today for the following health issues:  Chief Complaint   Patient presents with     URI     Start 3 days sx fever- 104 (temporal), chills and body aches, dry cough hx vaccinated, tx fluids, Kari,Tylenol and rest      HPI    DId not receive flu vaccine. Chills/body ache and fever. Taking tylenol.   Has not done a COVID test . Not currently pregnancy    There are no problems to display for this patient.    Current Outpatient Medications   Medication     vitamin D2 (ERGOCALCIFEROL) 96609 units (1250 mcg) capsule     No current facility-administered medications for this visit.         Objective    /68   Pulse 113   Temp (!) 100.7  F (38.2  C)   Wt 64.2 kg (141 lb 9.6 oz)   LMP 11/05/2022   SpO2 98%   BMI 24.31 kg/m    Physical Exam     CV: RRR no m/r/g  Pulm: clear bilaterally  GEN: NAD ,aaox3    Results for orders placed or performed in visit on 11/21/22   Influenza A & B Antigen - Clinic Collect     Status: Abnormal    Specimen: Nose; Swab   Result Value Ref Range    Influenza A antigen Positive (A) Negative    Influenza B antigen Negative Negative    Narrative    Test results must be correlated with clinical data. If necessary, results should be confirmed by a molecular assay or viral culture.                 The use of Dragon[employer] services may have been used to construct the content in this note;  any grammatical or spelling errors are non-intentional. Please contact the author of this note directly if you are in need of any clarification.

## 2023-01-07 ENCOUNTER — MYC REFILL (OUTPATIENT)
Dept: PEDIATRICS | Facility: CLINIC | Age: 33
End: 2023-01-07

## 2023-01-07 DIAGNOSIS — E55.9 VITAMIN D DEFICIENCY: ICD-10-CM

## 2023-01-07 NOTE — LETTER
January 17, 2023      [place]is Levi  [id number] [place] [address]              Dear Deloris,    We recently received a call from your pharmacy requesting a refill of your medication Vitamin D2.    We are contacting you today to notify you that you are due for a lab only appointment for further refills.    We have authorized one refill of your medication to allow time for you to schedule your appointment.    Please call [phone] schedule an appointment or if you have MyChart you can schedule with your provider as well.    Taking care of your health is important to us, an ongoing visits with your provider are vital to your care. We look forward to seeing you in the near future.    Thank you for using [facility] for your Medical Needs.            Sincerely,      Susana Groves CNP

## 2023-01-09 RX ORDER — ERGOCALCIFEROL 1.25 MG/1
50000 CAPSULE, LIQUID FILLED ORAL WEEKLY
Qty: 8 CAPSULE | Refills: 0 | OUTPATIENT
Start: 2023-01-09

## 2023-01-10 RX ORDER — ERGOCALCIFEROL 1.25 MG/1
50000 CAPSULE, LIQUID FILLED ORAL WEEKLY
Qty: 8 CAPSULE | Refills: 0 | Status: SHIPPED | OUTPATIENT
Start: 2023-01-10 | End: 2023-02-04

## 2023-01-29 ENCOUNTER — HEALTH MAINTENANCE LETTER (OUTPATIENT)
Age: 33
End: 2023-01-29

## 2023-05-11 ENCOUNTER — ANCILLARY PROCEDURE (OUTPATIENT)
Dept: GENERAL RADIOLOGY | Facility: CLINIC | Age: 33
End: 2023-05-11
Attending: PODIATRIST
Payer: COMMERCIAL

## 2023-05-11 ENCOUNTER — LAB (OUTPATIENT)
Dept: LAB | Facility: CLINIC | Age: 33
End: 2023-05-11
Payer: COMMERCIAL

## 2023-05-11 ENCOUNTER — OFFICE VISIT (OUTPATIENT)
Dept: PODIATRY | Facility: CLINIC | Age: 33
End: 2023-05-11
Payer: COMMERCIAL

## 2023-05-11 VITALS
DIASTOLIC BLOOD PRESSURE: 68 MMHG | SYSTOLIC BLOOD PRESSURE: 102 MMHG | BODY MASS INDEX: 24.07 KG/M2 | WEIGHT: 141 LBS | HEIGHT: 64 IN

## 2023-05-11 DIAGNOSIS — L65.9 HAIR LOSS: ICD-10-CM

## 2023-05-11 DIAGNOSIS — M21.41 PES PLANUS OF BOTH FEET: ICD-10-CM

## 2023-05-11 DIAGNOSIS — M79.672 LEFT FOOT PAIN: Primary | ICD-10-CM

## 2023-05-11 DIAGNOSIS — M79.672 LEFT FOOT PAIN: ICD-10-CM

## 2023-05-11 DIAGNOSIS — G57.92 NEURITIS OF LEFT FOOT: ICD-10-CM

## 2023-05-11 DIAGNOSIS — E55.9 VITAMIN D DEFICIENCY: ICD-10-CM

## 2023-05-11 DIAGNOSIS — M21.42 PES PLANUS OF BOTH FEET: ICD-10-CM

## 2023-05-11 DIAGNOSIS — R79.0 LOW IRON STORES: ICD-10-CM

## 2023-05-11 LAB
CRP SERPL-MCNC: <3 MG/L
ERYTHROCYTE [SEDIMENTATION RATE] IN BLOOD BY WESTERGREN METHOD: 18 MM/HR (ref 0–20)
FERRITIN SERPL-MCNC: 34 NG/ML (ref 6–175)
URATE SERPL-MCNC: 5 MG/DL (ref 2.4–5.7)

## 2023-05-11 PROCEDURE — 73630 X-RAY EXAM OF FOOT: CPT | Mod: TC | Performed by: RADIOLOGY

## 2023-05-11 PROCEDURE — 99203 OFFICE O/P NEW LOW 30 MIN: CPT | Mod: 25 | Performed by: PODIATRIST

## 2023-05-11 PROCEDURE — 64450 NJX AA&/STRD OTHER PN/BRANCH: CPT | Performed by: PODIATRIST

## 2023-05-11 PROCEDURE — 86140 C-REACTIVE PROTEIN: CPT

## 2023-05-11 PROCEDURE — 86038 ANTINUCLEAR ANTIBODIES: CPT

## 2023-05-11 PROCEDURE — 84550 ASSAY OF BLOOD/URIC ACID: CPT

## 2023-05-11 PROCEDURE — 82306 VITAMIN D 25 HYDROXY: CPT

## 2023-05-11 PROCEDURE — 86431 RHEUMATOID FACTOR QUANT: CPT

## 2023-05-11 PROCEDURE — 36415 COLL VENOUS BLD VENIPUNCTURE: CPT

## 2023-05-11 PROCEDURE — 82728 ASSAY OF FERRITIN: CPT

## 2023-05-11 PROCEDURE — 85652 RBC SED RATE AUTOMATED: CPT

## 2023-05-11 RX ORDER — BUPIVACAINE HYDROCHLORIDE 5 MG/ML
2 INJECTION, SOLUTION EPIDURAL; INTRACAUDAL
Status: SHIPPED | OUTPATIENT
Start: 2023-05-11

## 2023-05-11 RX ADMIN — BUPIVACAINE HYDROCHLORIDE 2 ML: 5 INJECTION, SOLUTION EPIDURAL; INTRACAUDAL at 12:04

## 2023-05-11 NOTE — LETTER
5/11/2023         RE: Deloris Levi  [address] [address] [facility] [address]        Dear Colleague,    Thank you for referring your patient, Deloris Levi, to the [facility]. Please see a copy of my visit note below.    PATIENT HISTORY:    Deloris Levi is a 33 year old female who presents to clinic for pain to the back of the left heel.  Has been going on for 2 weeks.  Notes it came on suddenly.  Is only sore whenever she has pressure on the area.  Does not really hurt when she is walking.  Notes that the burning pain sometimes she has aching.  Denies specific injury.  Notes that she just had knee pain recently that kind of went away on her right knee.  Notes that her father has rheumatoid arthritis.  She has been using ice packs and massage to the area but it has not really been helping.  She is wondering what is causing the pain and what can be done for it.  She will state sometimes it will shoot up to her hip but not too often.    Review of Systems:  Patient denies fever, chills, rash, wound, stiffness,numbness, weakness, heart burn, blood in stool, chest pain with activity, calf pain when walking, shortness of breath with activity, chronic cough, easy bleeding/bruising, swelling of ankles, excessive thirst, fatigue, depression, anxiety.      PAST MEDICAL HISTORY: No past medical history on file.     PAST SURGICAL HISTORY: No past surgical history on file.     MEDICATIONS:   Current Outpatient Medications:      vitamin D2 (ERGOCALCIFEROL) 60184 units (1250 mcg) capsule, Take 1 capsule (50,000 Units) by mouth once a week, Disp: 8 capsule, Rfl: 0     ALLERGIES:  No Known Allergies     SOCIAL HISTORY:   Social History     Socioeconomic History     Marital status:      Spouse name: Not on file     Number of children: Not on file     Years of education: Not on file     Highest education level: Not on file   Occupational History     Not on file   Tobacco Use      "Smoking status: Never     Smokeless tobacco: Never   Vaping Use     Vaping status: Not on file   Substance and Sexual Activity     Alcohol use: Never     Drug use: Never     Sexual activity: Yes     Partners: Male   Other Topics Concern     Not on file   Social History Narrative     Not on file     Social Determinants of Health     Financial Resource Strain: Not on file   Food Insecurity: Not on file   Transportation Needs: Not on file   Physical Activity: Not on file   Stress: Not on file   Social Connections: Not on file   Intimate Partner Violence: Not on file   Housing Stability: Not on file        FAMILY HISTORY: No family history on file.     EXAM:Vitals: Ht 1.626 m (5' 4\")   Wt 64 kg (141 lb)   BMI 24.20 kg/m    BMI= Body mass index is 24.2 kg/m .    General appearance: Patient is alert and fully cooperative with history & exam.  No sign of distress is noted during the visit.     Psychiatric: Affect is pleasant & appropriate.  Patient appears motivated to improve health.     Respiratory: Breathing is regular & unlabored while sitting.     HEENT: Hearing is intact to spoken word.  Speech is clear.  No gross evidence of visual impairment that would impact ambulation.     Dermatologic: Skin is intact to both lower extremities without significant lesions, rash or abrasion.  No paronychia or evidence of soft tissue infection is noted.     Vascular: DP & PT pulses are intact & regular bilaterally.  No significant edema or varicosities noted.  CFT and skin temperature is normal to both lower extremities.     Neurologic: Lower extremity sensation is intact to light touch.  No evidence of weakness or contracture in the lower extremities.  No evidence of neuropathy.     Musculoskeletal: Patient is ambulatory without assistive device or brace.  Decreased arch height.  Pain on palpation of the left lateral heel just above the Achilles tendon insertion.  No pain with muscle strength testing and patient is 5 out of 5 for " dorsiflexion, plantarflexion, eversion, inversion.    Radiographs: left foot xray - I personally reviewed the xrays -decreased calcaneal inclination angle.  Accessory navicular is noted.  Otherwise x-rays are unremarkable.  No fractures are noted.     ASSESSMENT:    Left foot pain  Neuritis of left foot  Pes planus of both feet     Medical Decision Making/Plan:  Reviewed patient[facility] chart in [facility].  Discussed with patient and patient's  that I am wondering if this is more nerve related given her symptoms and pinpoint pain.  We will try a diagnostic injection to this area today to see if this gets rid of the pain.  If it does not get rid of the pain for any amount of time then I would recommend an MRI of the left ankle.  We will also order inflammatory labs as well as possible rheumatoid lab to see if she does have this that she has family history of that as well.  Since it does not hurt with walking we will hold off on putting her in a boot at this time.  She will MyChart me tomorrow to see if she had any relief of pain.  If she did may refer back to the primary care doctor or neurologist.  All questions were answered to patient's satisfaction and she will call further questions or concerns.    Procedure: Medium Joint Injection/Arthrocentesis: L ankle    Date/Time: 5/11/2023 12:04 PM    Performed by: Sonia Kilgore DPM, Podiatry/Foot and Ankle Surgery  Authorized by: Sonia Kilgore DPM, Podiatry/Foot and Ankle Surgery    Indications:  Pain  Needle Size:  25 G  Guidance: surface landmarks    Approach:  Lateral  Location:  Ankle  Location comment:  Left sural nerve injection  Site:  L ankle  Medications:  2 mL bupivacaine (PF) 0.5 %  Outcome:  Tolerated well, no immediate complications  Procedure discussed: discussed risks, benefits, and alternatives    Consent Given by:  Patient  Timeout: timeout called immediately prior to procedure    Prep: patient was prepped and draped in usual sterile fashion             Patient risk factor: Patient is at low risk for infection.        Sonia Kilgore DPM, Podiatry/Foot and Ankle Surgery        Again, thank you for allowing me to participate in the care of your patient.        Sincerely,        Sonia Kilgore DPM, Podiatry/Foot and Ankle Surgery

## 2023-05-11 NOTE — PROGRESS NOTES
PATIENT HISTORY:    Deloris Levi is a 33 year old female who presents to clinic for pain to the back of the left heel.  Has been going on for 2 weeks.  Notes it came on suddenly.  Is only sore whenever she has pressure on the area.  Does not really hurt when she is walking.  Notes that the burning pain sometimes she has aching.  Denies specific injury.  Notes that she just had knee pain recently that kind of went away on her right knee.  Notes that her father has rheumatoid arthritis.  She has been using ice packs and massage to the area but it has not really been helping.  She is wondering what is causing the pain and what can be done for it.  She will state sometimes it will shoot up to her hip but not too often.    Review of Systems:  Patient denies fever, chills, rash, wound, stiffness,numbness, weakness, heart burn, blood in stool, chest pain with activity, calf pain when walking, shortness of breath with activity, chronic cough, easy bleeding/bruising, swelling of ankles, excessive thirst, fatigue, depression, anxiety.      PAST MEDICAL HISTORY: No past medical history on file.     PAST SURGICAL HISTORY: No past surgical history on file.     MEDICATIONS:   Current Outpatient Medications:      vitamin D2 (ERGOCALCIFEROL) 30367 units (1250 mcg) capsule, Take 1 capsule (50,000 Units) by mouth once a week, Disp: 8 capsule, Rfl: 0     ALLERGIES:  No Known Allergies     SOCIAL HISTORY:   Social History     Socioeconomic History     Marital status:      Spouse name: Not on file     Number of children: Not on file     Years of education: Not on file     Highest education level: Not on file   Occupational History     Not on file   Tobacco Use     Smoking status: Never     Smokeless tobacco: Never   Vaping Use     Vaping status: Not on file   Substance and Sexual Activity     Alcohol use: Never     Drug use: Never     Sexual activity: Yes     Partners: Male   Other Topics Concern     Not on file   Social  "History Narrative     Not on file     Social Determinants of Health     Financial Resource Strain: Not on file   Food Insecurity: Not on file   Transportation Needs: Not on file   Physical Activity: Not on file   Stress: Not on file   Social Connections: Not on file   Intimate Partner Violence: Not on file   Housing Stability: Not on file        FAMILY HISTORY: No family history on file.     EXAM:Vitals: Ht 1.626 m (5' 4\")   Wt 64 kg (141 lb)   BMI 24.20 kg/m    BMI= Body mass index is 24.2 kg/m .    General appearance: Patient is alert and fully cooperative with history & exam.  No sign of distress is noted during the visit.     Psychiatric: Affect is pleasant & appropriate.  Patient appears motivated to improve health.     Respiratory: Breathing is regular & unlabored while sitting.     HEENT: Hearing is intact to spoken word.  Speech is clear.  No gross evidence of visual impairment that would impact ambulation.     Dermatologic: Skin is intact to both lower extremities without significant lesions, rash or abrasion.  No paronychia or evidence of soft tissue infection is noted.     Vascular: DP & PT pulses are intact & regular bilaterally.  No significant edema or varicosities noted.  CFT and skin temperature is normal to both lower extremities.     Neurologic: Lower extremity sensation is intact to light touch.  No evidence of weakness or contracture in the lower extremities.  No evidence of neuropathy.     Musculoskeletal: Patient is ambulatory without assistive device or brace.  Decreased arch height.  Pain on palpation of the left lateral heel just above the Achilles tendon insertion.  No pain with muscle strength testing and patient is 5 out of 5 for dorsiflexion, plantarflexion, eversion, inversion.    Radiographs: left foot xray - I personally reviewed the xrays -decreased calcaneal inclination angle.  Accessory navicular is noted.  Otherwise x-rays are unremarkable.  No fractures are noted.   "   ASSESSMENT:    Left foot pain  Neuritis of left foot  Pes planus of both feet     Medical Decision Making/Plan:  Reviewed patient's chart in [facility].  Discussed with patient and patient's  that I am wondering if this is more nerve related given her symptoms and pinpoint pain.  We will try a diagnostic injection to this area today to see if this gets rid of the pain.  If it does not get rid of the pain for any amount of time then I would recommend an MRI of the left ankle.  We will also order inflammatory labs as well as possible rheumatoid lab to see if she does have this that she has family history of that as well.  Since it does not hurt with walking we will hold off on putting her in a boot at this time.  She will MyChart me tomorrow to see if she had any relief of pain.  If she did may refer back to the primary care doctor or neurologist.  All questions were answered to patient's satisfaction and she will call further questions or concerns.    Procedure: Medium Joint Injection/Arthrocentesis: L ankle    Date/Time: 5/11/2023 12:04 PM    Performed by: Sonia Kilgore DPM, Podiatry/Foot and Ankle Surgery  Authorized by: Sonia Kilgore DPM, Podiatry/Foot and Ankle Surgery    Indications:  Pain  Needle Size:  25 G  Guidance: surface landmarks    Approach:  Lateral  Location:  Ankle  Location comment:  Left sural nerve injection  Site:  L ankle  Medications:  2 mL bupivacaine (PF) 0.5 %  Outcome:  Tolerated well, no immediate complications  Procedure discussed: discussed risks, benefits, and alternatives    Consent Given by:  Patient  Timeout: timeout called immediately prior to procedure    Prep: patient was prepped and draped in usual sterile fashion            Patient risk factor: Patient is at low risk for infection.        Sonia Kilgore DPM, Podiatry/Foot and Ankle Surgery

## 2023-05-11 NOTE — PATIENT INSTRUCTIONS
Thank you for choosing [facility] / Foot & Ankle Surgery!    DR AYALA'S CLINIC:  [facility]   [address]   [place] [address]      TRIAGE LINE: [phone]  APPOINTMENTS: [phone]  RADIOLOGY: [phone]  SET UP SURGERY: [phone]  PHYSICAL THERAPY: [phone]   FAX NUMBER: [phone]  BILLING QUESTIONS: [phone]       Follow up: Call if injection does not help and can place a Neurology referral.    NEURITIS  Neuritis is more of a symptom rather than a condition. Basically it's inflammation of a peripheral nerve, often accompanied by degenerative changes in the nervous tissue. The effects are usually a tingling, burning, pin-and-needle sensation, or even a loss of sensation much like a peripheral neuropathy. This can also be due to back injury or arthritis.    The treatment can be directed at the underlying cause. There are many possible causes. The cause can be mechanical by injury or pressure to the nerve or it can be vascular by occlusion of the vessels that supply blood to the nerve. It can be infectious and caused by invading microorganisms, or metabolic by a deficiencies in vitamins or pernicious anemia.      Treatment is normally targeted at the symptoms for pain. Using lidocaine cream to numb the area, physical therapy, compounding cream, or injection. Also talking with your primary care about possible medication directed at nerves such as lyrica, neurontin, gabapentin, amytriptylline, or duloxitine.

## 2023-05-12 ENCOUNTER — MYC MEDICAL ADVICE (OUTPATIENT)
Dept: PODIATRY | Facility: CLINIC | Age: 33
End: 2023-05-12
Payer: COMMERCIAL

## 2023-05-12 LAB
ANA SER QL IF: NEGATIVE
DEPRECATED CALCIDIOL+CALCIFEROL SERPL-MC: 17 UG/L (ref 20–75)
RHEUMATOID FACT SER NEPH-ACNC: <7 IU/ML

## 2023-05-16 ENCOUNTER — TELEPHONE (OUTPATIENT)
Dept: PEDIATRICS | Facility: CLINIC | Age: 33
End: 2023-05-16
Payer: COMMERCIAL

## 2023-05-16 DIAGNOSIS — E55.9 VITAMIN D DEFICIENCY: Primary | ICD-10-CM

## 2023-10-24 SDOH — HEALTH STABILITY: PHYSICAL HEALTH: ON AVERAGE, HOW MANY DAYS PER WEEK DO YOU ENGAGE IN MODERATE TO STRENUOUS EXERCISE (LIKE A BRISK WALK)?: 3 DAYS

## 2023-10-24 SDOH — HEALTH STABILITY: PHYSICAL HEALTH: ON AVERAGE, HOW MANY MINUTES DO YOU ENGAGE IN EXERCISE AT THIS LEVEL?: 20 MIN

## 2023-10-24 ASSESSMENT — LIFESTYLE VARIABLES
HOW OFTEN DO YOU HAVE A DRINK CONTAINING ALCOHOL: NEVER
HOW MANY STANDARD DRINKS CONTAINING ALCOHOL DO YOU HAVE ON A TYPICAL DAY: PATIENT DOES NOT DRINK
HOW OFTEN DO YOU HAVE SIX OR MORE DRINKS ON ONE OCCASION: NEVER
AUDIT-C TOTAL SCORE: 0
SKIP TO QUESTIONS 9-10: 1

## 2023-10-24 ASSESSMENT — ENCOUNTER SYMPTOMS
DIARRHEA: 0
SHORTNESS OF BREATH: 0
CONSTIPATION: 0
FEVER: 0
DYSURIA: 0
JOINT SWELLING: 0
PARESTHESIAS: 0
NERVOUS/ANXIOUS: 1
COUGH: 0
ABDOMINAL PAIN: 0
HEMATURIA: 0
SORE THROAT: 0
EYE PAIN: 0
HEADACHES: 0
HEARTBURN: 0
DIZZINESS: 0
CHILLS: 0
PALPITATIONS: 0
FREQUENCY: 0
NAUSEA: 0
BREAST MASS: 0
ARTHRALGIAS: 1
MYALGIAS: 0
WEAKNESS: 1
HEMATOCHEZIA: 0

## 2023-10-24 ASSESSMENT — SOCIAL DETERMINANTS OF HEALTH (SDOH)
DO YOU BELONG TO ANY CLUBS OR ORGANIZATIONS SUCH AS CHURCH GROUPS UNIONS, FRATERNAL OR ATHLETIC GROUPS, OR SCHOOL GROUPS?: NO
HOW OFTEN DO YOU ATTENT MEETINGS OF THE CLUB OR ORGANIZATION YOU BELONG TO?: NEVER
IN A TYPICAL WEEK, HOW MANY TIMES DO YOU TALK ON THE PHONE WITH FAMILY, FRIENDS, OR NEIGHBORS?: MORE THAN THREE TIMES A WEEK
HOW OFTEN DO YOU GET TOGETHER WITH FRIENDS OR RELATIVES?: ONCE A WEEK
HOW OFTEN DO YOU ATTEND CHURCH OR RELIGIOUS SERVICES?: MORE THAN 4 TIMES PER YEAR

## 2023-10-24 ASSESSMENT — PATIENT HEALTH QUESTIONNAIRE - PHQ9
10. IF YOU CHECKED OFF ANY PROBLEMS, HOW DIFFICULT HAVE THESE PROBLEMS MADE IT FOR YOU TO DO YOUR WORK, TAKE CARE OF THINGS AT HOME, OR GET ALONG WITH OTHER PEOPLE: SOMEWHAT DIFFICULT
SUM OF ALL RESPONSES TO PHQ QUESTIONS 1-9: 7
SUM OF ALL RESPONSES TO PHQ QUESTIONS 1-9: 7

## 2023-10-24 ASSESSMENT — ANXIETY QUESTIONNAIRES
IF YOU CHECKED OFF ANY PROBLEMS ON THIS QUESTIONNAIRE, HOW DIFFICULT HAVE THESE PROBLEMS MADE IT FOR YOU TO DO YOUR WORK, TAKE CARE OF THINGS AT HOME, OR GET ALONG WITH OTHER PEOPLE: SOMEWHAT DIFFICULT
1. FEELING NERVOUS, ANXIOUS, OR ON EDGE: MORE THAN HALF THE DAYS
7. FEELING AFRAID AS IF SOMETHING AWFUL MIGHT HAPPEN: NOT AT ALL
3. WORRYING TOO MUCH ABOUT DIFFERENT THINGS: MORE THAN HALF THE DAYS
6. BECOMING EASILY ANNOYED OR IRRITABLE: MORE THAN HALF THE DAYS
4. TROUBLE RELAXING: NOT AT ALL
GAD7 TOTAL SCORE: 8
GAD7 TOTAL SCORE: 8
2. NOT BEING ABLE TO STOP OR CONTROL WORRYING: MORE THAN HALF THE DAYS
5. BEING SO RESTLESS THAT IT IS HARD TO SIT STILL: NOT AT ALL

## 2023-10-25 ENCOUNTER — OFFICE VISIT (OUTPATIENT)
Dept: FAMILY MEDICINE | Facility: CLINIC | Age: 33
End: 2023-10-25
Payer: COMMERCIAL

## 2023-10-25 VITALS
TEMPERATURE: 98.3 F | HEIGHT: 64 IN | SYSTOLIC BLOOD PRESSURE: 120 MMHG | BODY MASS INDEX: 24.81 KG/M2 | DIASTOLIC BLOOD PRESSURE: 68 MMHG | RESPIRATION RATE: 18 BRPM | WEIGHT: 145.3 LBS | OXYGEN SATURATION: 100 % | HEART RATE: 63 BPM

## 2023-10-25 DIAGNOSIS — Z11.59 NEED FOR HEPATITIS C SCREENING TEST: ICD-10-CM

## 2023-10-25 DIAGNOSIS — L67.8 ABNORMAL FACIAL HAIR: ICD-10-CM

## 2023-10-25 DIAGNOSIS — R53.83 OTHER FATIGUE: ICD-10-CM

## 2023-10-25 DIAGNOSIS — E28.2 PCOS (POLYCYSTIC OVARIAN SYNDROME): ICD-10-CM

## 2023-10-25 DIAGNOSIS — Z00.00 ROUTINE GENERAL MEDICAL EXAMINATION AT A HEALTH CARE FACILITY: Primary | ICD-10-CM

## 2023-10-25 LAB
ALBUMIN SERPL BCG-MCNC: 4.5 G/DL (ref 3.5–5.2)
ALP SERPL-CCNC: 59 U/L (ref 35–104)
ALT SERPL W P-5'-P-CCNC: 9 U/L (ref 0–50)
ANION GAP SERPL CALCULATED.3IONS-SCNC: 11 MMOL/L (ref 7–15)
AST SERPL W P-5'-P-CCNC: 15 U/L (ref 0–45)
BILIRUB SERPL-MCNC: 0.2 MG/DL
BUN SERPL-MCNC: 8.3 MG/DL (ref 6–20)
CALCIUM SERPL-MCNC: 9.7 MG/DL (ref 8.6–10)
CHLORIDE SERPL-SCNC: 104 MMOL/L (ref 98–107)
CHOLEST SERPL-MCNC: 209 MG/DL
CREAT SERPL-MCNC: 0.68 MG/DL (ref 0.51–0.95)
DEPRECATED HCO3 PLAS-SCNC: 26 MMOL/L (ref 22–29)
EGFRCR SERPLBLD CKD-EPI 2021: >90 ML/MIN/1.73M2
ERYTHROCYTE [DISTWIDTH] IN BLOOD BY AUTOMATED COUNT: 12.5 % (ref 10–15)
GLUCOSE SERPL-MCNC: 105 MG/DL (ref 70–99)
HBA1C MFR BLD: 5.3 % (ref 0–5.6)
HCT VFR BLD AUTO: 38 % (ref 35–47)
HCV AB SERPL QL IA: NONREACTIVE
HDLC SERPL-MCNC: 48 MG/DL
HGB BLD-MCNC: 12.2 G/DL (ref 11.7–15.7)
LDLC SERPL CALC-MCNC: 142 MG/DL
MCH RBC QN AUTO: 28.9 PG (ref 26.5–33)
MCHC RBC AUTO-ENTMCNC: 32.1 G/DL (ref 31.5–36.5)
MCV RBC AUTO: 90 FL (ref 78–100)
NONHDLC SERPL-MCNC: 161 MG/DL
PLATELET # BLD AUTO: 262 10E3/UL (ref 150–450)
POTASSIUM SERPL-SCNC: 3.7 MMOL/L (ref 3.4–5.3)
PROT SERPL-MCNC: 7.8 G/DL (ref 6.4–8.3)
RBC # BLD AUTO: 4.22 10E6/UL (ref 3.8–5.2)
SODIUM SERPL-SCNC: 141 MMOL/L (ref 135–145)
TRIGL SERPL-MCNC: 94 MG/DL
TSH SERPL DL<=0.005 MIU/L-ACNC: 2.24 UIU/ML (ref 0.3–4.2)
VIT D+METAB SERPL-MCNC: 23 NG/ML (ref 20–50)
WBC # BLD AUTO: 6.2 10E3/UL (ref 4–11)

## 2023-10-25 PROCEDURE — 83036 HEMOGLOBIN GLYCOSYLATED A1C: CPT | Performed by: NURSE PRACTITIONER

## 2023-10-25 PROCEDURE — 90686 IIV4 VACC NO PRSV 0.5 ML IM: CPT | Performed by: NURSE PRACTITIONER

## 2023-10-25 PROCEDURE — 80053 COMPREHEN METABOLIC PANEL: CPT | Performed by: NURSE PRACTITIONER

## 2023-10-25 PROCEDURE — 86803 HEPATITIS C AB TEST: CPT | Performed by: NURSE PRACTITIONER

## 2023-10-25 PROCEDURE — 36415 COLL VENOUS BLD VENIPUNCTURE: CPT | Performed by: NURSE PRACTITIONER

## 2023-10-25 PROCEDURE — 90480 ADMN SARSCOV2 VAC 1/ONLY CMP: CPT | Performed by: NURSE PRACTITIONER

## 2023-10-25 PROCEDURE — 90471 IMMUNIZATION ADMIN: CPT | Performed by: NURSE PRACTITIONER

## 2023-10-25 PROCEDURE — 82306 VITAMIN D 25 HYDROXY: CPT | Performed by: NURSE PRACTITIONER

## 2023-10-25 PROCEDURE — 84443 ASSAY THYROID STIM HORMONE: CPT | Performed by: NURSE PRACTITIONER

## 2023-10-25 PROCEDURE — 99395 PREV VISIT EST AGE 18-39: CPT | Mod: 25 | Performed by: NURSE PRACTITIONER

## 2023-10-25 PROCEDURE — 80061 LIPID PANEL: CPT | Performed by: NURSE PRACTITIONER

## 2023-10-25 PROCEDURE — 91320 SARSCV2 VAC 30MCG TRS-SUC IM: CPT | Performed by: NURSE PRACTITIONER

## 2023-10-25 PROCEDURE — 99214 OFFICE O/P EST MOD 30 MIN: CPT | Mod: 25 | Performed by: NURSE PRACTITIONER

## 2023-10-25 PROCEDURE — 85027 COMPLETE CBC AUTOMATED: CPT | Performed by: NURSE PRACTITIONER

## 2023-10-25 RX ORDER — SPIRONOLACTONE 25 MG/1
25 TABLET ORAL DAILY
Qty: 30 TABLET | Refills: 5 | Status: SHIPPED | OUTPATIENT
Start: 2023-10-25

## 2023-10-25 ASSESSMENT — ENCOUNTER SYMPTOMS
FEVER: 0
HEADACHES: 0
CONSTIPATION: 0
DIARRHEA: 0
DIZZINESS: 0
NERVOUS/ANXIOUS: 1
WEAKNESS: 1
SORE THROAT: 0
NAUSEA: 0
FREQUENCY: 0
ABDOMINAL PAIN: 0
EYE PAIN: 0
DYSURIA: 0
PARESTHESIAS: 0
HEARTBURN: 0
ARTHRALGIAS: 1
PALPITATIONS: 0
COUGH: 0
HEMATURIA: 0
CHILLS: 0
HEMATOCHEZIA: 0
MYALGIAS: 0
JOINT SWELLING: 0
BREAST MASS: 0
SHORTNESS OF BREATH: 0

## 2023-10-25 ASSESSMENT — PAIN SCALES - GENERAL: PAINLEVEL: NO PAIN (0)

## 2023-10-25 NOTE — PROGRESS NOTES
SUBJECTIVE:   CC: Deloris is an 33 year old who presents for preventive health visit.       10/25/2023     1:01 PM   Additional Questions   Roomed by Gloria FUNES       Healthy Habits:     Getting at least 3 servings of Calcium per day:  Yes    Bi-annual eye exam:  NO    Dental care twice a year:  NO    Sleep apnea or symptoms of sleep apnea:  Daytime drowsiness    Diet:  Regular (no restrictions) and Low fat/cholesterol    Frequency of exercise:  None    Taking medications regularly:  Yes    Medication side effects:  None    Additional concerns today:  No    She is here for a physical, she is having abnormal periods, she is having more hairs that are growing on her chins and she does have a history of PCOS and she has been controlling her weight. She feels that something is now right. She did change her job and she is feeing more stress, she is having knee/joint pain. She would like to get some lab work done. She has been feeling more palpitations and she knows that it is from her job, she feels her heart is heavy, she is not interested in treating.     Today's PHQ-9 Score:       10/24/2023     6:03 PM   PHQ-9 SCORE   PHQ-9 Total Score MyChart 7 (Mild depression)   PHQ-9 Total Score 7           Social History     Tobacco Use    Smoking status: Never    Smokeless tobacco: Never   Substance Use Topics    Alcohol use: Never             10/24/2023     6:06 PM   Alcohol Use   Prescreen: >3 drinks/day or >7 drinks/week? Not Applicable     Reviewed orders with patient.  Reviewed health maintenance and updated orders accordingly - Yes  Lab work is in process  Labs reviewed in EPIC    Breast Cancer Screening: na due to age         10/25/2023    12:52 PM   Breast CA Risk Assessment (FHS-7)   Do you have a family history of breast, colon, or ovarian cancer? No / Unknown         Patient under 40 years of age: Routine Mammogram Screening not recommended.   Pertinent mammograms are reviewed under the imaging tab.    History of  "abnormal Pap smear: NO - age 30-65 PAP every 5 years with negative HPV co-testing recommended      Latest Ref Rng & Units 12/7/2021     3:53 PM   PAP / HPV   PAP  Negative for Intraepithelial Lesion or Malignancy (NILM)    HPV 16 DNA Negative Negative    HPV 18 DNA Negative Negative    Other HR HPV Negative Negative      Reviewed and updated as needed this visit by clinical staff   Tobacco  Allergies  Meds              Reviewed and updated as needed this visit by Provider                 No past medical history on file.   No past surgical history on file.  OB History   No obstetric history on file.       Review of Systems   Constitutional:  Negative for chills and fever.   HENT:  Negative for congestion, ear pain, hearing loss and sore throat.    Eyes:  Negative for pain and visual disturbance.   Respiratory:  Negative for cough and shortness of breath.    Cardiovascular:  Negative for chest pain, palpitations and peripheral edema.   Gastrointestinal:  Negative for abdominal pain, constipation, diarrhea, heartburn, hematochezia and nausea.   Breasts:  Negative for tenderness, breast mass and discharge.   Genitourinary:  Negative for dysuria, frequency, genital sores, hematuria, pelvic pain, urgency, vaginal bleeding and vaginal discharge.   Musculoskeletal:  Positive for arthralgias. Negative for joint swelling and myalgias.   Skin:  Negative for rash.   Neurological:  Positive for weakness. Negative for dizziness, headaches and paresthesias.   Psychiatric/Behavioral:  Positive for mood changes. The patient is nervous/anxious.           OBJECTIVE:   /68 (BP Location: Right arm, Patient Position: Sitting, Cuff Size: Adult Regular)   Pulse 63   Temp 98.3  F (36.8  C) (Oral)   Resp 18   Ht 1.619 m (5' 3.75\")   Wt 65.9 kg (145 lb 4.8 oz)   LMP 09/04/2023 (Exact Date)   SpO2 100%   BMI 25.14 kg/m    Physical Exam  GENERAL: healthy, alert and no distress  NECK: no adenopathy, no asymmetry, masses, or " scars and thyroid normal to palpation  RESP: lungs clear to auscultation - no rales, rhonchi or wheezes  CV: regular rate and rhythm, normal S1 S2, no S3 or S4, no murmur, click or rub, no peripheral edema and peripheral pulses strong  ABDOMEN: soft, nontender, no hepatosplenomegaly, no masses and bowel sounds normal  MS: no gross musculoskeletal defects noted, no edema    Diagnostic Test Results:  Labs reviewed in Epic    ASSESSMENT/PLAN:   Deloris was seen today for physical.    Diagnoses and all orders for this visit:    Routine general medical examination at a health care facility  -     CBC with platelets; Future  -     Comprehensive metabolic panel (BMP + Alb, Alk Phos, ALT, AST, Total. Bili, TP); Future  -     CBC with platelets  -     Comprehensive metabolic panel (BMP + Alb, Alk Phos, ALT, AST, Total. Bili, TP)    Need for hepatitis C screening test  -     Hepatitis C Screen Reflex to HCV RNA Quant and Genotype; Future  -     Hepatitis C Screen Reflex to HCV RNA Quant and Genotype    PCOS (polycystic ovarian syndrome)  -     TSH with free T4 reflex; Future  -     Hemoglobin A1c; Future  -     Lipid panel reflex to direct LDL Fasting; Future  -     TSH with free T4 reflex  -     Hemoglobin A1c  -     Lipid panel reflex to direct LDL Fasting    Abnormal facial hair  -     TSH with free T4 reflex; Future  -     Hemoglobin A1c; Future  -     spironolactone (ALDACTONE) 25 MG tablet; Take 1 tablet (25 mg) by mouth daily  -     TSH with free T4 reflex  -     Hemoglobin A1c    Other fatigue  -     CBC with platelets; Future  -     TSH with free T4 reflex; Future  -     Hemoglobin A1c; Future  -     Vitamin D Deficiency; Future  -     CBC with platelets  -     TSH with free T4 reflex  -     Hemoglobin A1c  -     Vitamin D Deficiency    Other orders  -     REVIEW OF HEALTH MAINTENANCE PROTOCOL ORDERS  -     INFLUENZA VACCINE IM > 6 MONTHS VALENT IIV4 (AFLURIA/FLUZONE)  -     COVID-19 12+ (2023-24)  "(PFIZER)        Patient has been advised of split billing requirements and indicates understanding: Yes      COUNSELING:  Reviewed preventive health counseling, as reflected in patient instructions       Regular exercise       Healthy diet/nutrition      BMI:   Estimated body mass index is 25.14 kg/m  as calculated from the following:    Height as of this encounter: 1.619 m (5' 3.75\").    Weight as of this encounter: 65.9 kg (145 lb 4.8 oz).   Weight management plan: Discussed healthy diet and exercise guidelines      She reports that she has never smoked. She has never used smokeless tobacco.          Gretchen Tate NP  [facility]  Answers submitted by the patient for this visit:  Patient Health Questionnaire (Submitted on 10/24/2023)  If you checked off any problems, how difficult have these problems made it for you to do your work, take care of things at home, or get along with other people?: Somewhat difficult  PHQ9 TOTAL SCORE: 7  JODI-7 (Submitted on 10/24/2023)  JODI 7 TOTAL SCORE: 8    "

## 2024-12-01 ENCOUNTER — HEALTH MAINTENANCE LETTER (OUTPATIENT)
Age: 34
End: 2024-12-01